# Patient Record
Sex: MALE | Race: WHITE | URBAN - METROPOLITAN AREA
[De-identification: names, ages, dates, MRNs, and addresses within clinical notes are randomized per-mention and may not be internally consistent; named-entity substitution may affect disease eponyms.]

---

## 2017-05-23 ENCOUNTER — HOSPITAL ENCOUNTER (EMERGENCY)
Facility: CLINIC | Age: 22
Discharge: HOME | End: 2017-05-23
Attending: EMERGENCY MEDICINE | Admitting: EMERGENCY MEDICINE
Payer: COMMERCIAL

## 2017-05-23 ENCOUNTER — APPOINTMENT (OUTPATIENT)
Dept: GENERAL RADIOLOGY | Facility: CLINIC | Age: 22
End: 2017-05-23
Attending: EMERGENCY MEDICINE
Payer: COMMERCIAL

## 2017-05-23 VITALS
SYSTOLIC BLOOD PRESSURE: 129 MMHG | HEIGHT: 72 IN | DIASTOLIC BLOOD PRESSURE: 65 MMHG | TEMPERATURE: 98 F | RESPIRATION RATE: 18 BRPM | WEIGHT: 174 LBS | OXYGEN SATURATION: 100 % | HEART RATE: 100 BPM | BODY MASS INDEX: 23.57 KG/M2

## 2017-05-23 DIAGNOSIS — S93.491A SPRAIN OF OTHER LIGAMENT OF RIGHT ANKLE, INITIAL ENCOUNTER: ICD-10-CM
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
## 2017-05-23 PROCEDURE — 99283 EMERGENCY DEPT VISIT LOW MDM: CPT | Performed by: EMERGENCY MEDICINE

## 2017-05-23 PROCEDURE — 73610 X-RAY EXAM OF ANKLE: CPT | Mod: RT

## 2017-05-23 PROCEDURE — 99284 EMERGENCY DEPT VISIT MOD MDM: CPT | Mod: Z6 | Performed by: EMERGENCY MEDICINE

## 2017-05-23 RX ORDER — IBUPROFEN 200 MG
600 TABLET ORAL 3 TIMES DAILY
Qty: 45 TABLET | Refills: 0 | Status: SHIPPED | OUTPATIENT
Start: 2017-05-23 | End: 2017-05-28

## 2017-05-23 RX ORDER — TRAMADOL HYDROCHLORIDE 50 MG/1
50-100 TABLET ORAL EVERY 8 HOURS PRN
Qty: 20 TABLET | Refills: 0 | Status: SHIPPED | OUTPATIENT
Start: 2017-05-23

## 2017-05-23 NOTE — ED AVS SNAPSHOT
[facility], [place], Emergency Department    [address] [address]    Phone:  [phone]                                       Huseyin Ballard   MRN: [id number]    Department:  [facility], Emergency Department   Date of Visit:  5/23/2017           After Visit Summary Signature Page     I have received my discharge instructions, and my questions have been answered. I have discussed any challenges I see with this plan with the nurse or doctor.    ..........................................................................................................................................  Patient/Patient Representative Signature      ..........................................................................................................................................  Patient Representative Print Name and Relationship to Patient    ..................................................               ................................................  Date                                            Time    ..........................................................................................................................................  Reviewed by Signature/Title    ...................................................              ..............................................  Date                                                            Time

## 2017-05-23 NOTE — LETTER
[facility], [place], EMERGENCY DEPARTMENT  [address] [address]  [phone]    May 23, 2017    Huseyin Ballard  [address] [address]  [phone] (home)     : 1995      To Whom it may concern:    Huseyin Ballard was seen in our Emergency Department today, May 23, 2017.  I expect his condition to improve over the next week.  He may return to work/school on 17 but will be on crutches and wearing a ankle brace for the next week.    Sincerely,        Carlos Alberto Jauregui MD

## 2017-05-23 NOTE — ED AVS SNAPSHOT
[facility], Emergency Department    [address] [address]    Phone:  [phone]                                       Huseyin Ballard   MRN: [id number]    Department:  [facility], Emergency Department   Date of Visit:  5/23/2017           Patient Information     Date Of Birth          1995        Your diagnoses for this visit were:     Sprain of other ligament of right ankle, initial encounter anterior talofibular and syndesmotic       You were seen by Carlos Alberto Jauregui MD.        Discharge Instructions       Wear ankle brace while ambulating over the next week.    Use crutches for the next 3 days but increased total touching as tolerated.    Elevate your right ankle as much as possible over the next 24 hours with ice.    Please make an appointment to follow up with [facility] (phone: [phone]) or our Sports Medicine Clinic (phone: [phone]) in one week for recheck.    Discharge References/Attachments     SPRAIN, ANKLE (ADULT) (ENGLISH)      24 Hour Appointment Hotline       To make an appointment at any [place] clinic, call [phone] ([phone]). If you don't have a family doctor or clinic, we will help you find one. [facility] [place] clinics are conveniently located to serve the needs of you and your family.          ED Discharge Orders     Air Stirrup adult                    Review of your medicines      START taking        Dose / Directions Last dose taken    ibuprofen 200 MG tablet   Commonly known as:  ADVIL/MOTRIN   Dose:  600 mg   Quantity:  45 tablet        Take 3 tablets (600 mg) by mouth 3 times daily for 5 days   Refills:  0        traMADol 50 MG tablet   Commonly known as:  ULTRAM   Dose:   mg   Quantity:  20 tablet        Take 1-2 tablets ( mg) by mouth every 8 hours as needed for moderate pain   Refills:  0                Prescriptions were sent or printed at these locations (2 Prescriptions)                   Other  Prescriptions                Printed at Department/Unit printer (2 of 2)         ibuprofen (ADVIL/MOTRIN) 200 MG tablet               traMADol (ULTRAM) 50 MG tablet                Procedures and tests performed during your visit     Ankle XR, G/E 3 views, right      Orders Needing Specimen Collection     None      Pending Results     Date and Time Order Name Status Description    5/23/2017 2002 Ankle XR, G/E 3 views, right Preliminary             Pending Culture Results     No orders found from 5/21/2017 to 5/24/2017.            Pending Results Instructions     If you had any lab results that were not finalized at the time of your Discharge, you can call the ED Lab Result RN at [phone]. You will be contacted by this team for any positive Lab results or changes in treatment. The nurses are available 7 days a week from 10A to 6:30P.  You can leave a message 24 hours per day and they will return your call.        Thank you for choosing [facility]       Thank you for choosing [facility] for your care. Our goal is always to provide you with excellent care. Hearing back from our patients is one way we can continue to improve our services. Please take a few minutes to complete the written survey that you may receive in the mail after you visit with us. Thank you!        [employer] Information     [employer] gives you secure access to your electronic health record. If you see a primary care provider, you can also send messages to your care team and make appointments. If you have questions, please call your primary care clinic.  If you do not have a primary care provider, please call [phone] and they will assist you.        Care EveryWhere ID     This is your Care EveryWhere ID. This could be used by other organizations to access your [facility] medical records  [id number]        After Visit Summary       This is your record. Keep this with you and show to your community pharmacist(s) and doctor(s) at your next visit.

## 2017-05-24 NOTE — DISCHARGE INSTRUCTIONS
Wear ankle brace while ambulating over the next week.    Use crutches for the next 3 days but increased total touching as tolerated.    Elevate your right ankle as much as possible over the next 24 hours with ice.    Please make an appointment to follow up with [facility] (phone: [phone]) or our Sports Medicine Clinic (phone: [phone]) in one week for recheck.

## 2017-05-24 NOTE — ED NOTES
Pt was playing basketball this evening, rolled ankle  C/o right ankle pain and swelling  Using crutches, favoring ankle

## 2017-05-24 NOTE — ED PROVIDER NOTES
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  Patient had immediate swelling and pain.  Patient was unable to bear weight.  Patient borrowed some cr[facility] the [facility] and came to the ER for evaluation.  Patient denies any previous ankle surgery or problems.  Patient denies any other injury other than to his right ankle.    I have reviewed the Medications, Allergies, Past Medical and Surgical History, and Social History in the ecomom system.  Past Medical History:   Diagnosis Date     Closed fracture of 5th metacarpal        No current facility-administered medications on file prior to encounter.   No current outpatient prescriptions on file prior to encounter.  No Known Allergies  Social History     Social History     Marital status: Single     Spouse name: N/A     Number of children: N/A     Years of education: N/A     Occupational History     Not on file.     Social History Main Topics     Smoking status: Never Smoker     Smokeless tobacco: Never Used     Alcohol use No     Drug use: No     Sexual activity: Not on file     Other Topics Concern     Not on file     Social History Narrative     Past Surgical History:   Procedure Laterality Date     wisdom teeth       Family History   Problem Relation Age of Onset     DIABETES No family hx of        Review of Systems   All other systems reviewed and are negative.      Physical Exam   BP: 129/65  Pulse: 110  Temp: 98  F (36.7  C)  Resp: 16  Height: 182.9 cm (6')  Weight: 78.9 kg (174 lb)  SpO2: 97 %  Physical Exam   Constitutional: He is oriented to person, place, and time. He appears distressed (mild secondary to right ankle pain).   Patient presents on crutches to the ER unable to bear weight on his right lower extremity.  He is alert and conversant   HENT:    Head: Atraumatic.   Eyes: EOM are normal. Pupils are equal, round, and reactive to light.   Neck: Neck supple.   Musculoskeletal:   The right lower extremity reveals significant edema over the distal fibula and over the lateral malleolus.  Distal CMS is intact.  There is tenderness of the anterior talofibular ligament but also there is syndesmotic ligament type swelling present.  There is no ankle instability.  There is no proximal tib-fib tenderness.  Other extremities are all intact and nontender   Neurological: He is alert and oriented to person, place, and time.   Skin: Skin is warm.   Psychiatric: He has a normal mood and affect.       ED Course     ED Course     Procedures        Results for orders placed or performed during the hospital encounter of 05/23/17   Ankle XR, G/E 3 views, right    Narrative    Preliminary     Impression    Impression:  Swelling about the ankle with no fracture.    Full report to follow.        Assessments & Plan (with Medical Decision Making)     I have reviewed the nursing notes.    I have reviewed the findings, diagnosis, plan and need for follow up with the patient.    New Prescriptions    IBUPROFEN (ADVIL/MOTRIN) 200 MG TABLET    Take 3 tablets (600 mg) by mouth 3 times daily for 5 days    TRAMADOL (ULTRAM) 50 MG TABLET    Take 1-2 tablets ( mg) by mouth every 8 hours as needed for moderate pain       Final diagnoses:   Sprain of other ligament of right ankle, initial encounter - anterior talofibular and syndesmotic     Wear ankle brace while ambulating over the next week.    Use crutches for the next 3 days but increased total touching as tolerated.    Elevate your right ankle as much as possible over the next 24 hours with ice.    Please make an appointment to follow up with [facility] (phone: [phone]) or our Sports Medicine Clinic (phone: [phone]) in one week for recheck.    Routine discharge instructions were given for this diagnosis.   Work/school note given.    Carlos Alberto Jauregui MD    5/23/2017   [facility], [place], EMERGENCY DEPARTMENT     Carlos Alberto Jauregui MD  05/23/17 2049

## 2020-03-10 ENCOUNTER — HEALTH MAINTENANCE LETTER (OUTPATIENT)
Age: 25
End: 2020-03-10

## 2020-12-27 ENCOUNTER — HEALTH MAINTENANCE LETTER (OUTPATIENT)
Age: 25
End: 2020-12-27

## 2021-04-24 ENCOUNTER — HEALTH MAINTENANCE LETTER (OUTPATIENT)
Age: 26
End: 2021-04-24

## 2021-10-03 ENCOUNTER — HEALTH MAINTENANCE LETTER (OUTPATIENT)
Age: 26
End: 2021-10-03

## 2022-05-15 ENCOUNTER — HEALTH MAINTENANCE LETTER (OUTPATIENT)
Age: 27
End: 2022-05-15

## 2022-09-11 ENCOUNTER — HEALTH MAINTENANCE LETTER (OUTPATIENT)
Age: 27
End: 2022-09-11

## 2023-06-03 ENCOUNTER — HEALTH MAINTENANCE LETTER (OUTPATIENT)
Age: 28
End: 2023-06-03